# Patient Record
Sex: MALE | Employment: PART TIME | URBAN - METROPOLITAN AREA
[De-identification: names, ages, dates, MRNs, and addresses within clinical notes are randomized per-mention and may not be internally consistent; named-entity substitution may affect disease eponyms.]

---
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
## 2020-03-02 ENCOUNTER — HEALTH MAINTENANCE LETTER (OUTPATIENT)
Age: 20
End: 2020-03-02
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
## 2020-05-06 ENCOUNTER — VIRTUAL VISIT (OUTPATIENT)
Dept: PSYCHIATRY | Facility: CLINIC | Age: 20
End: 2020-05-06
Attending: PSYCHIATRY & NEUROLOGY

## 2020-05-06 DIAGNOSIS — F90.2 ATTENTION DEFICIT HYPERACTIVITY DISORDER (ADHD), COMBINED TYPE: ICD-10-CM

## 2020-05-06 RX ORDER — METHYLPHENIDATE HYDROCHLORIDE 54 MG/1
54 TABLET ORAL DAILY
Qty: 30 TABLET | Refills: 0 | Status: SHIPPED | OUTPATIENT
Start: 2020-05-06 | End: 2020-06-04

## 2020-05-06 RX ORDER — METHYLPHENIDATE HYDROCHLORIDE 54 MG/1
54 TABLET ORAL DAILY
Qty: 30 TABLET | Refills: 0 | Status: SHIPPED | OUTPATIENT
Start: 2020-06-06 | End: 2020-07-06

## 2020-05-06 RX ORDER — METHYLPHENIDATE HYDROCHLORIDE 54 MG/1
54 TABLET ORAL DAILY
Qty: 30 TABLET | Refills: 0 | Status: SHIPPED | OUTPATIENT
Start: 2020-07-07 | End: 2020-08-06

## 2020-05-07 NOTE — PATIENT INSTRUCTIONS
Continue Concerta 54mg daily    Contact clinic when you have a month supply of medication left if you plan on staying in [place] for forseeable future, otherwise plan on finding psychiatrist in [place].    Self-help Resources for Outpatient Adults with ADHD    Books:  1. Taking Charge of Adult ADHD - 1st Edition, by Chandler Lloyd (Author)   -probably the closest thing to an actual self-guided manual    2. Mastering Your Adult ADHD: A Cognitive-Behavioral Treatment Program, Client Workbook (Treatments That Work) Paperback - 15 Jed 2017 by Mau Spears (Author), Deonna Campbell (Contributor), Carol A. Perlman (Contributor)   -written to be paired with the therapist's guide during therapy, but can be used as a self-guided manual    3. Understand Your Brain, Get More Done: The ADHD Executive Functions Workbook Paperback - April 1, 2012 by Jae Yousif PsyD, MBA (Author)     4. The Mindfulness Prescription for Adult ADHD: An 8-Step Program for Strengthening Attention, Managing Emotions, and Achieving Your Goals Paperback - 28 Mar 2012 by Lula Galarza  (Author)    5. Women with Attention Deficit Disorder  - May 1, 2005 by Elizabeth Menjivar  (Author), MS (Author)    6. You Mean I'm Not Lazy, Stupid or Crazy?!: The Classic Self-Help Book for Adults with Attention Deficit Disorder Paperback - April 25, 2006 by Marilyn Crawford  (Author), Jenna Burleson  (Author), Harrison Wilson M.D. (Foreword)    Websites:    Https://www.getselfhelp.co.uk/adhd.htm    https://add.org/  Attention Deficit Disorder Association    https://www.additudemag.com (ADDitudes magazine:  on-line articles and forum about ADHD across lifespan)    https://totallyadd.com/ (engaging / funny videos, books and on-line resource about adult ADHD)    KRISTEL.org (The national organization for those with ADHD; annual conferences to the public and local chapters available)    https://adhd-institute.com  Educational website for patients and clinicians maintained by  "pharmaceutical company Fernando      Resources for Women with ADD/ADHD:    http://ncgiadd.org/  (A resource for women and girls with ADHD)    www.[place].[employer]    www.[employer]    Www.womenResQUentalhealth.org    Zully Denise \"How to ADHD\"  -provides tips strategies and insights into ADHD from her own personal experiences in a funny, heartfelt and easy to understand way  Website:  https://[employer]/  [employer] Channel:  \"How to ADHD\"  https://www.[employer].com/channel/UC-nPM1_kSZf91ZGkcgy_95Q/featured  Tedx Talk: Failing at Normal: An ADHD Success Story https://www.HealthClinicPlusube.com/watch?v=JiwZQNYlGQI        Finding a support group:    ADHD Coaching and Other resources  Local   Https://adhdtreatmentsoKey Ingredient Corporation.[employer]/  Https://WoraPay.org/#[facility] area    Thank you for coming to the PSYCHIATRY CLINIC.    Lab Testing:  If you had lab testing today and your results are reassuring or normal they will be mailed to you or sent through HowGood within 7 days.   If the lab tests need quick action we will call you with the results.  The phone number we will call with results is # [phone] (home) . If this is not the best number please call our clinic and change the number.    Medication Refills:  If you need any refills please call your pharmacy and they will contact us. Our fax number for refills is [phone]. Please allow three business for refill processing.   If you need to  your refill at a new pharmacy, please contact the new pharmacy directly. The new pharmacy will help you get your medications transferred.     Scheduling:  If you have any concerns about today's visit or wish to schedule another appointment please call our office during normal business hours [phone] (8-5:00 M-F)    Contact Us:  Please call [phone] during business hours (8-5:00 M-F).  If after clinic hours, or on the weekend, please call [phone].    Financial Assistance: [phone]  [place] Billing: " [phone]  Central Billing Office, [facility]: [phone]  [place] [phone]  Medical Records: [phone]    MENTAL HEALTH CRISIS NUMBERS:  [facility]:   [facility]: [phone]  [facility]ris[facility]idence [facility]: [phone]   Walk-[facility] Counseling Center [facility]: [phone]   COPE  [facility] Mobile Team: [phone] (adults) [phone] (child)     [facility]:   [facility]: [phone]   Walk-in counseling [facility]: [phone]   Walk-in counseling [facility]: [phone]   Crisis Residence [facility] Residence: [phone]   Urgent Care Adult Mental Health: [phone] Mobile team AND  crisis line    Other Crisis Numbers:   National Suicide Prevention Lifeline: 110-716-MSHB (825-976-1053)  CRISIS TEXT LINE: Text to 531822 for any crisis ; OR www.crisistextline.org   Poison Control Center: 1-622.823.7284  CHILD: [facility] needs assessment team: [phone]   Trans Lifeline: 1-188.526.8459  [employer] Lifeline: 1-123.177.8493    For a medical emergency please call 911 or go to the nearest ER.         _____________________________________________    Again thank you for choosing PSYCHIATRY CLINIC and please let us know how we can best partner with you to improve you and your family's health.    You may be receiving a survey regarding this appointment. We would love to have your feedback, both positive and negative. The survey is done by an external company, so your answers are anonymous.

## 2020-06-04 ENCOUNTER — MYC MEDICAL ADVICE (OUTPATIENT)
Dept: PSYCHIATRY | Facility: CLINIC | Age: 20
End: 2020-06-04

## 2020-06-04 DIAGNOSIS — F90.2 ATTENTION DEFICIT HYPERACTIVITY DISORDER (ADHD), COMBINED TYPE: ICD-10-CM

## 2020-06-04 RX ORDER — METHYLPHENIDATE HYDROCHLORIDE 54 MG/1
54 TABLET ORAL DAILY
Qty: 30 TABLET | Refills: 0 | Status: SHIPPED | OUTPATIENT
Start: 2020-06-04

## 2020-06-04 NOTE — TELEPHONE ENCOUNTER
Per [place]  with search range of 6/4/19-6/4/20 (no dates earlier than one year ago are allowed, [place] is not listed on [employer] search): We were able to find this patient. However, there are no prescription records within the prescription fill dates provided. Please try a longer date range.  Call to original pharmacy ([phone]) was routed to [address].  Pended order with start date 5/6/20 and routed to provider.      06/04/20 [id number]  Heydi Bolton MD    E-Prescribing Status: Receipt confirmed by pharmacy (6/4/2020  3:34 PM CDT)

## 2020-12-20 ENCOUNTER — HEALTH MAINTENANCE LETTER (OUTPATIENT)
Age: 20
End: 2020-12-20

## 2021-04-18 ENCOUNTER — HEALTH MAINTENANCE LETTER (OUTPATIENT)
Age: 21
End: 2021-04-18

## 2021-10-03 ENCOUNTER — HEALTH MAINTENANCE LETTER (OUTPATIENT)
Age: 21
End: 2021-10-03

## 2022-05-14 ENCOUNTER — HEALTH MAINTENANCE LETTER (OUTPATIENT)
Age: 22
End: 2022-05-14

## 2022-09-04 ENCOUNTER — HEALTH MAINTENANCE LETTER (OUTPATIENT)
Age: 22
End: 2022-09-04

## 2023-01-06 NOTE — LETTER
[facility]  [address] [address]  Phone: [phone]    September 16, 2018      RE:  Al Dixon  [address]   [place] [address]          To whom it may concern:    RE: Al Zack    Please excuse Al from work today due to medical illness.    Please contact me for questions or concerns.      Sincerely,        Brayan Hester MD   Doxepin Counseling:  Patient advised that the medication is sedating and not to drive a car after taking this medication. Patient informed of potential adverse effects including but not limited to dry mouth, urinary retention, and blurry vision.  The patient verbalized understanding of the proper use and possible adverse effects of doxepin.  All of the patient's questions and concerns were addressed.

## 2023-06-03 ENCOUNTER — HEALTH MAINTENANCE LETTER (OUTPATIENT)
Age: 23
End: 2023-06-03